# Patient Record
Sex: FEMALE | Race: ASIAN | Employment: FULL TIME | URBAN - METROPOLITAN AREA
[De-identification: names, ages, dates, MRNs, and addresses within clinical notes are randomized per-mention and may not be internally consistent; named-entity substitution may affect disease eponyms.]

---

## 2023-05-30 ENCOUNTER — HOSPITAL ENCOUNTER (EMERGENCY)
Age: 48
Discharge: HOME | End: 2023-05-30
Attending: EMERGENCY MEDICINE
Payer: COMMERCIAL

## 2023-05-30 VITALS
HEART RATE: 106 BPM | DIASTOLIC BLOOD PRESSURE: 98 MMHG | TEMPERATURE: 99.3 F | WEIGHT: 150 LBS | SYSTOLIC BLOOD PRESSURE: 140 MMHG | BODY MASS INDEX: 24.99 KG/M2 | RESPIRATION RATE: 16 BRPM | HEIGHT: 65 IN | OXYGEN SATURATION: 97 %

## 2023-05-30 DIAGNOSIS — L03.211 CELLULITIS OF FACE: Primary | ICD-10-CM

## 2023-05-30 PROCEDURE — 99283 EMERGENCY DEPT VISIT LOW MDM: CPT

## 2023-05-30 RX ORDER — CEPHALEXIN 500 MG/1
500 CAPSULE ORAL 4 TIMES DAILY
Qty: 28 CAPSULE | Refills: 0 | Status: SHIPPED | OUTPATIENT
Start: 2023-05-30 | End: 2023-05-30 | Stop reason: SDUPTHER

## 2023-05-30 RX ORDER — CEPHALEXIN 500 MG/1
500 CAPSULE ORAL 4 TIMES DAILY
Qty: 28 CAPSULE | Refills: 0 | Status: SHIPPED | OUTPATIENT
Start: 2023-05-30 | End: 2023-06-06

## 2023-05-30 ASSESSMENT — LIFESTYLE VARIABLES
HOW OFTEN DO YOU HAVE A DRINK CONTAINING ALCOHOL: NEVER
HOW MANY STANDARD DRINKS CONTAINING ALCOHOL DO YOU HAVE ON A TYPICAL DAY: PATIENT DOES NOT DRINK

## 2023-05-30 ASSESSMENT — PAIN DESCRIPTION - LOCATION: LOCATION: FACE

## 2023-05-30 ASSESSMENT — PAIN DESCRIPTION - ORIENTATION: ORIENTATION: RIGHT

## 2023-05-30 ASSESSMENT — PAIN DESCRIPTION - DESCRIPTORS: DESCRIPTORS: ACHING

## 2023-05-30 ASSESSMENT — VISUAL ACUITY: OU: 1

## 2023-05-30 ASSESSMENT — PAIN SCALES - GENERAL: PAINLEVEL_OUTOF10: 6

## 2023-05-30 ASSESSMENT — PAIN - FUNCTIONAL ASSESSMENT: PAIN_FUNCTIONAL_ASSESSMENT: 0-10

## 2023-05-30 NOTE — ED PROVIDER NOTES
Emergency Department Provider Note       PCP: None None   Age: 51 y.o. Sex: female     DISPOSITION Decision To Discharge 05/30/2023 03:16:18 PM       ICD-10-CM    1. Cellulitis of face  L03.211           Medical Decision Making     Complexity of Problems Addressed:  Complexity of Problem: 1 acute, uncomplicated illness or injury. Data Reviewed and Analyzed:  Category 1:   I independently ordered and reviewed each unique test.         Category 2:       Category 3: Discussion of management or test interpretation. Overall well-appearing 48-year-old female presents emergency department today with plaint of swelling and tenderness to the right side of her face. She appears in no acute distress today. She is not toxic or ill-appearing. Exam is most consistent with cellulitis. Will treat with Keflex. We discussed red flag symptoms and return precautions. Encouraged follow-up primary care for recheck. Risk of Complications and/or Morbidity of Patient Management:  Prescription drug management performed and Shared medical decision making was utilized in creating the patients health plan today. History     Inez Mario is a 51 y.o. female who presents to the Emergency Department with chief complaint of    Chief Complaint   Patient presents with    Facial Pain      48-year-old female presents emergency department today with complaint of right-sided facial pain and swelling for 3 days. She is concerned she may have gotten bit insect. She states she went to a outdoor family gathering recently and symptoms started shortly afterwards. She reports some tenderness to the area but denies other symptoms. She denies any fever, chills, chest pain, abdominal pain, shortness of breath, dental pain, or congestion. The history is provided by the patient. Physical Exam     Vitals signs and nursing note reviewed.    Vitals:    05/30/23 1454   BP: (!) 140/98   Pulse: (!) 106   Resp: 16   Temp: 99.3 °F (37.4

## 2023-05-30 NOTE — ED NOTES
I have reviewed discharge instructions with the patient. The patient verbalized understanding. Patient left ED via Discharge Method: ambulatory to Home with self. Opportunity for questions and clarification provided. Patient given 1 scripts. To continue your aftercare when you leave the hospital, you may receive an automated call from our care team to check in on how you are doing. This is a free service and part of our promise to provide the best care and service to meet your aftercare needs.  If you have questions, or wish to unsubscribe from this service please call [phone]. Thank you for Choosing our [facility] Emergency Department.         Alexx Bird RN  05/30/23 [id number]

## 2023-05-30 NOTE — DISCHARGE INSTRUCTIONS
Take medication as prescribed. Take with food. You may not notice much improvement until you have been on the antibiotic for 2-3 days. Apply warm compresses with a warm cloth for 5-10 minutes 2-3 times a day. Wash area gently with soap and water. Return to the ER for any new, worsening, or concerning symptoms.